# Patient Record
Sex: FEMALE | Race: WHITE | NOT HISPANIC OR LATINO | URBAN - METROPOLITAN AREA
[De-identification: names, ages, dates, MRNs, and addresses within clinical notes are randomized per-mention and may not be internally consistent; named-entity substitution may affect disease eponyms.]

---
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
## 2022-06-02 ENCOUNTER — OFFICE VISIT (OUTPATIENT)
Dept: OBSTETRICS AND GYNECOLOGY | Facility: CLINIC | Age: 22
End: 2022-06-02

## 2022-06-02 VITALS
BODY MASS INDEX: 22.22 KG/M2 | SYSTOLIC BLOOD PRESSURE: 100 MMHG | DIASTOLIC BLOOD PRESSURE: 58 MMHG | WEIGHT: 155.2 LBS | HEIGHT: 70 IN

## 2022-06-02 DIAGNOSIS — Z11.3 SCREENING FOR STD (SEXUALLY TRANSMITTED DISEASE): ICD-10-CM

## 2022-06-02 DIAGNOSIS — Z01.419 ENCOUNTER FOR GYNECOLOGICAL EXAMINATION WITHOUT ABNORMAL FINDING: Primary | ICD-10-CM

## 2022-06-02 DIAGNOSIS — Z30.40 ENCOUNTER FOR REFILL OF PRESCRIPTION FOR CONTRACEPTION: ICD-10-CM

## 2022-06-02 PROCEDURE — 99395 PREV VISIT EST AGE 18-39: CPT | Performed by: OBSTETRICS & GYNECOLOGY

## 2022-06-02 RX ORDER — LEVONORGESTREL AND ETHINYL ESTRADIOL 0.1-0.02MG
1 KIT ORAL DAILY
Qty: 84 TABLET | Refills: 3 | Status: SHIPPED | OUTPATIENT
Start: 2022-06-02 | End: 2023-06-02

## 2022-06-02 NOTE — PROGRESS NOTES
Gynecologic Annual Exam Note        CC- Here for annual exam.     Subjective     HPI  Shavon Douglas is a 21 y.o. female established patient who presents for annual well woman exam. Patient's last menstrual period was 2022 (exact date). Her periods are regular every 28-30 days, lasting 5 days and are heavy for the first 3 days and then the last 2 days are light and clots are present.  She reports moderate, severe dysmenorrhea for the first 3 days of her period and 1 day prior to the start of it.  Partner Status: Marital Status: single.  New Partners since last visit: YES/NO/Other: yes.  Condom usage with IC: always.    The patient has complaints today. She reports that she is still having right lower pelvic pain around her ovary, she reports it has improved since her last visit and mainly notices the pain around her period. She also needs a refill on her BC pills. She is requesting a 90 day supply if possible.      She exercises regularly: yes.  She has concerns about domestic violence: no.    OB History        0    Para   0    Term   0       0    AB   0    Living   0       SAB   0    IAB   0    Ectopic   0    Molar   0    Multiple   0    Live Births   0                Current contraception: condoms and OCP (estrogen/progesterone)  History of abnormal Pap smear: no  Family history of uterine, colon or ovarian cancer: no  Family history of breast cancer: no  H/o STDs: no   Last pap: never preformed  Gardasil:completed    Last Pap : never preformed due to age, std screening negative on 23  Last Completed Pap Smear     This patient has no relevant Health Maintenance data.          Health Maintenance   Topic Date Due   • ANNUAL PHYSICAL  Never done   • HEPATITIS C SCREENING  Never done   • CHLAMYDIA SCREENING  2022   • INFLUENZA VACCINE  2022   • TDAP/TD VACCINES (2 - Td or Tdap) 2023   • COVID-19 Vaccine  Completed   • MENINGOCOCCAL VACCINE  Completed   • HPV VACCINES   "Completed   • Pneumococcal Vaccine 0-64  Aged Out       The following portions of the patient's history were reviewed and updated as appropriate: allergies, current medications, past family history, past medical history, past social history, past surgical history and problem list.    Review of Systems  Review of Systems   Constitutional: Negative.    HENT: Negative.    Eyes: Negative.    Respiratory: Negative.    Cardiovascular: Negative.    Gastrointestinal: Negative.    Endocrine: Negative.    Genitourinary: Positive for menstrual problem (painful menstruals ) and pelvic pain (right pelvic pain ).   Musculoskeletal: Negative.    Skin: Negative.    Allergic/Immunologic: Negative.    Neurological: Negative.    Hematological: Negative.    Psychiatric/Behavioral: Negative.        I have reviewed and agree with the HPI, ROS, and historical information as entered above. Lesa Abraham MD      Past Medical History:   Diagnosis Date   • Asthma as child    • Dysmenorrhea    • Gardasil Series Completed    • Irregular menses    • Menorrhagia         Past Surgical History:   Procedure Laterality Date   • FINGER FRACTURE SURGERY Left    • WISDOM TOOTH EXTRACTION            Objective   /58   Ht 177.8 cm (70\")   Wt 70.4 kg (155 lb 3.2 oz)   LMP 05/23/2022 (Exact Date)   BMI 22.27 kg/m²     Physical Exam  Vitals and nursing note reviewed. Exam conducted with a chaperone present.   Constitutional:       General: She is awake.   HENT:      Head: Normocephalic and atraumatic.   Neck:      Thyroid: No thyroid mass, thyromegaly or thyroid tenderness.   Cardiovascular:      Rate and Rhythm: Normal rate.      Heart sounds: No murmur heard.    No friction rub. No gallop.   Pulmonary:      Effort: Pulmonary effort is normal.      Breath sounds: Normal breath sounds. No stridor. No wheezing, rhonchi or rales.   Chest:      Chest wall: No mass or tenderness.   Breasts:      Right: Normal. No bleeding, inverted nipple, mass, nipple " discharge, skin change, tenderness, axillary adenopathy or supraclavicular adenopathy.      Left: Normal. No bleeding, inverted nipple, mass, nipple discharge, skin change, tenderness, axillary adenopathy or supraclavicular adenopathy.       Abdominal:      Palpations: Abdomen is soft.      Tenderness: There is no guarding or rebound.      Hernia: There is no hernia in the left inguinal area or right inguinal area.   Genitourinary:     General: Normal vulva.      Pubic Area: No rash.       Labia:         Right: No rash, tenderness, lesion or injury.         Left: No rash, tenderness, lesion or injury.       Urethra: No prolapse, urethral swelling or urethral lesion.      Vagina: No foreign body. No vaginal discharge, erythema, tenderness, bleeding or lesions.      Cervix: No cervical motion tenderness, discharge, friability, lesion, erythema or cervical bleeding.      Uterus: Normal. Not deviated, not enlarged, not fixed and not tender.       Adnexa: Right adnexa normal and left adnexa normal.        Right: No mass, tenderness or fullness.          Left: No mass, tenderness or fullness.        Rectum: No external hemorrhoid.   Musculoskeletal:      Cervical back: Normal range of motion.   Lymphadenopathy:      Upper Body:      Right upper body: No supraclavicular or axillary adenopathy.      Left upper body: No supraclavicular or axillary adenopathy.   Skin:     General: Skin is warm.   Neurological:      Mental Status: She is alert and oriented to person, place, and time.   Psychiatric:         Mood and Affect: Mood and affect normal.         Speech: Speech normal.          Assessment   Assessment  Problem List Items Addressed This Visit    None     Visit Diagnoses     Encounter for gynecological examination without abnormal finding    -  Primary    Screening for STD (sexually transmitted disease)        Relevant Orders    Chlamydia trachomatis, Neisseria gonorrhoeae, PCR w/ confirmation - Swab, Cervix    Encounter  for refill of prescription for contraception        Relevant Medications    levonorgestrel-ethinyl estradiol (AVIANE,ALESSE,LESSINA) 0.1-20 MG-MCG per tablet            Assessment     Problem List Items Addressed This Visit    None     Visit Diagnoses     Encounter for gynecological examination without abnormal finding    -  Primary    Screening for STD (sexually transmitted disease)        Relevant Orders    Chlamydia trachomatis, Neisseria gonorrhoeae, PCR w/ confirmation - Swab, Cervix    Encounter for refill of prescription for contraception        Relevant Medications    levonorgestrel-ethinyl estradiol (AVIANE,ALESSE,LESSINA) 0.1-20 MG-MCG per tablet            Plan     1. Encouraged use of condoms for STD prevention.  2. Reviewed monthly self breast exams.  Instructed to call with lumps, pain, or breast discharge.    3. RTC in 1 year or PRN with problems  4. Doing well on OCPs, periods much improved.  Call prn  5. Goes to , .  Wants to go to PT school.         Lesa Abraham MD  06/02/2022

## 2022-06-06 LAB
C TRACH RRNA SPEC QL NAA+PROBE: NEGATIVE
N GONORRHOEA RRNA SPEC QL NAA+PROBE: NEGATIVE

## 2022-06-07 ENCOUNTER — TELEPHONE (OUTPATIENT)
Dept: OBSTETRICS AND GYNECOLOGY | Facility: CLINIC | Age: 22
End: 2022-06-07

## 2023-05-27 DIAGNOSIS — Z30.40 ENCOUNTER FOR REFILL OF PRESCRIPTION FOR CONTRACEPTION: ICD-10-CM

## 2023-05-30 ENCOUNTER — TELEPHONE (OUTPATIENT)
Dept: OBSTETRICS AND GYNECOLOGY | Facility: CLINIC | Age: 23
End: 2023-05-30

## 2023-05-30 DIAGNOSIS — Z30.40 ENCOUNTER FOR REFILL OF PRESCRIPTION FOR CONTRACEPTION: ICD-10-CM

## 2023-05-30 RX ORDER — LEVONORGESTREL AND ETHINYL ESTRADIOL 0.1-0.02MG
1 KIT ORAL DAILY
Qty: 28 TABLET | Refills: 0 | Status: SHIPPED | OUTPATIENT
Start: 2023-05-30

## 2023-05-30 RX ORDER — LEVONORGESTREL AND ETHINYL ESTRADIOL 0.1-0.02MG
KIT ORAL
Qty: 28 TABLET | Refills: 0 | Status: SHIPPED | OUTPATIENT
Start: 2023-05-30 | End: 2023-05-30 | Stop reason: SDUPTHER

## 2023-05-30 NOTE — TELEPHONE ENCOUNTER
Patient calling to get BC refilled. Patient has annual schedule for 6/8/23.    Pharmacy on file has been verified

## 2023-06-08 ENCOUNTER — OFFICE VISIT (OUTPATIENT)
Dept: OBSTETRICS AND GYNECOLOGY | Facility: CLINIC | Age: 23
End: 2023-06-08
Payer: COMMERCIAL

## 2023-06-08 ENCOUNTER — TELEPHONE (OUTPATIENT)
Dept: OBSTETRICS AND GYNECOLOGY | Facility: CLINIC | Age: 23
End: 2023-06-08

## 2023-06-08 VITALS
WEIGHT: 157.8 LBS | DIASTOLIC BLOOD PRESSURE: 66 MMHG | HEIGHT: 70 IN | BODY MASS INDEX: 22.59 KG/M2 | SYSTOLIC BLOOD PRESSURE: 102 MMHG

## 2023-06-08 DIAGNOSIS — Z01.419 WELL WOMAN EXAM: Primary | ICD-10-CM

## 2023-06-08 DIAGNOSIS — N94.6 DYSMENORRHEA: ICD-10-CM

## 2023-06-08 DIAGNOSIS — Z30.40 ENCOUNTER FOR REFILL OF PRESCRIPTION FOR CONTRACEPTION: ICD-10-CM

## 2023-06-08 DIAGNOSIS — R10.2 PELVIC PAIN: ICD-10-CM

## 2023-06-08 DIAGNOSIS — Z11.3 SCREENING EXAMINATION FOR STD (SEXUALLY TRANSMITTED DISEASE): ICD-10-CM

## 2023-06-08 RX ORDER — IBUPROFEN 800 MG/1
800 TABLET ORAL EVERY 8 HOURS PRN
Qty: 30 TABLET | Refills: 0 | Status: SHIPPED | OUTPATIENT
Start: 2023-06-08

## 2023-06-08 RX ORDER — DROSPIRENONE AND ESTETROL 3-14.2(28)
1 KIT ORAL DAILY
Qty: 84 TABLET | Refills: 3 | Status: SHIPPED | OUTPATIENT
Start: 2023-06-08

## 2023-06-08 RX ORDER — IBUPROFEN 200 MG
200 TABLET ORAL EVERY 6 HOURS PRN
Qty: 30 TABLET | Refills: 3 | Status: CANCELLED | OUTPATIENT
Start: 2023-06-08

## 2023-06-08 NOTE — TELEPHONE ENCOUNTER
PA Case: [id number], Status: Approved for [employer], Coverage Starts on: 6/8/2023 12:00:00 AM, Coverage Ends on: 6/7/2024 12:00:00 AM.

## 2023-06-08 NOTE — PROGRESS NOTES
Gynecologic Annual Exam Note        CC- Here for annual exam.     Subjective     HPI  Shavon Douglas is a 22 y.o. female established patient who presents for annual well woman exam. Patient's last menstrual period was 2023 (exact date)..  Her periods are regular every 28-30 days, lasting 5 days and are heavy and clots are absent.  Patient reports using about 3 tampons per day. She reports moderate dysmenorrhea.  SPartner Status: Marital Status: single.  New Partners since last visit: YES/NO/Other: yes.  Condom usage with IC: always.    The patient has any complaints today. Patient reports intermittent pain in her right lower abdomen. She states she had an ovarian cyst 2 years ago. She describes the pain as a cramping, dull, sometimes sharp ache that radiates to her lower back. She notices an increase in pain after intercourse and before her menstrual cycle. She states her 600 mg of ibuprofen have helped with the pain but she is out and needs a refill of it.     She exercises regularly: yes.  She has concerns about domestic violence: no.    OB History          0    Para   0    Term   0       0    AB   0    Living   0         SAB   0    IAB   0    Ectopic   0    Molar   0    Multiple   0    Live Births   0                Current contraception: OCP (estrogen/progesterone). Patient is currently taking Lessina, would like to discuss other OCP's due to heavy flow  History of abnormal Pap smear:  never done  Family history of uterine, colon or ovarian cancer: no  Family history of breast cancer: no  H/o STDs: no  Last pap:never done  Gardasil:completed    Last Pap :   Last Completed Pap Smear       This patient has no relevant Health Maintenance data.            Health Maintenance   Topic Date Due    HEPATITIS C SCREENING  Never done    ANNUAL PHYSICAL  Never done    COVID-19 Vaccine (4 - Pfizer series) 2021    CHLAMYDIA SCREENING  2023    INFLUENZA VACCINE  2023    TDAP/TD  "VACCINES (3 - Td or Tdap) 06/02/2032    MENINGOCOCCAL VACCINE  Completed    HPV VACCINES  Completed    Pneumococcal Vaccine 0-64  Aged Out       The following portions of the patient's history were reviewed and updated as appropriate: allergies, current medications, past family history, past medical history, past social history, past surgical history, and problem list.    Review of Systems  Review of Systems   Constitutional: Negative.    HENT: Negative.     Eyes: Negative.    Respiratory: Negative.     Cardiovascular: Negative.    Gastrointestinal: Negative.    Endocrine: Negative.    Genitourinary: Negative.    Musculoskeletal: Negative.    Skin: Negative.    Allergic/Immunologic: Negative.    Neurological: Negative.    Hematological: Negative.    Psychiatric/Behavioral: Negative.       I have reviewed and agree with the HPI, ROS, and historical information as entered above. Lesa Abraham MD      Past Medical History:   Diagnosis Date    Asthma as child     Dysmenorrhea     Gardasil Series Completed     Irregular menses     Menorrhagia         Past Surgical History:   Procedure Laterality Date    FINGER FRACTURE SURGERY Left     WISDOM TOOTH EXTRACTION            Objective   /66 (BP Location: Right arm, Patient Position: Sitting, Cuff Size: Adult)   Ht 177.8 cm (70\")   Wt 71.6 kg (157 lb 12.8 oz)   LMP 05/29/2023 (Exact Date)   Breastfeeding No   BMI 22.64 kg/m²     Physical Exam  Vitals and nursing note reviewed. Exam conducted with a chaperone present.   Constitutional:       General: She is awake.   HENT:      Head: Normocephalic and atraumatic.   Neck:      Thyroid: No thyroid mass, thyromegaly or thyroid tenderness.   Cardiovascular:      Rate and Rhythm: Normal rate.      Heart sounds: No murmur heard.    No friction rub. No gallop.   Pulmonary:      Effort: Pulmonary effort is normal.      Breath sounds: Normal breath sounds. No stridor. No wheezing, rhonchi or rales.   Chest:      Chest wall: " No mass or tenderness.   Breasts:     Right: Normal. No bleeding, inverted nipple, mass, nipple discharge, skin change or tenderness.      Left: Normal. No bleeding, inverted nipple, mass, nipple discharge, skin change or tenderness.   Abdominal:      Palpations: Abdomen is soft.      Tenderness: There is no guarding or rebound.      Hernia: There is no hernia in the left inguinal area or right inguinal area.   Genitourinary:     General: Normal vulva.      Pubic Area: No rash.       Labia:         Right: No rash, tenderness, lesion or injury.         Left: No rash, tenderness, lesion or injury.       Urethra: No prolapse, urethral swelling or urethral lesion.      Vagina: No foreign body. No vaginal discharge, erythema, tenderness, bleeding or lesions.      Cervix: No cervical motion tenderness, discharge, friability, lesion, erythema or cervical bleeding.      Uterus: Normal. Not deviated, not enlarged, not fixed and not tender.       Adnexa: Right adnexa normal and left adnexa normal.        Right: No mass, tenderness or fullness.          Left: No mass, tenderness or fullness.        Rectum: No external hemorrhoid.   Musculoskeletal:      Cervical back: Normal range of motion.   Lymphadenopathy:      Upper Body:      Right upper body: No supraclavicular or axillary adenopathy.      Left upper body: No supraclavicular or axillary adenopathy.   Skin:     General: Skin is warm.   Neurological:      Mental Status: She is alert and oriented to person, place, and time.   Psychiatric:         Mood and Affect: Mood and affect normal.         Speech: Speech normal.        Assessment   Assessment  Problem List Items Addressed This Visit    None  Visit Diagnoses       Well woman exam    -  Primary    Relevant Orders    Chlamydia trachomatis, Neisseria gonorrhoeae, PCR - Swab, Cervix    Screening examination for STD (sexually transmitted disease)        Relevant Orders    Chlamydia trachomatis, Neisseria gonorrhoeae, PCR -  Swab, Cervix    Encounter for refill of prescription for contraception        Dysmenorrhea        Relevant Medications    ibuprofen (ADVIL,MOTRIN) 800 MG tablet    Pelvic pain        Relevant Medications    ibuprofen (ADVIL,MOTRIN) 800 MG tablet    Other Relevant Orders    US Non-ob Transvaginal              Assessment     Problem List Items Addressed This Visit    None  Visit Diagnoses       Well woman exam    -  Primary    Relevant Orders    Chlamydia trachomatis, Neisseria gonorrhoeae, PCR - Swab, Cervix    Screening examination for STD (sexually transmitted disease)        Relevant Orders    Chlamydia trachomatis, Neisseria gonorrhoeae, PCR - Swab, Cervix    Encounter for refill of prescription for contraception        Dysmenorrhea        Relevant Medications    ibuprofen (ADVIL,MOTRIN) 800 MG tablet    Pelvic pain        Relevant Medications    ibuprofen (ADVIL,MOTRIN) 800 MG tablet    Other Relevant Orders    US Non-ob Transvaginal              Plan     Encouraged use of condoms for STD prevention.  Reviewed monthly self breast exams.  Instructed to call with lumps, pain, or breast discharge.    Pt having long and heavy periods with current OCP.  We will try switching it to see if this helps.  2 samples Nextellis given today.    Pt reports intermittent RLQ pain that she feels may be secondary to an ovarian cyst.  She is taking rx motrin and this helps.  The pain is dull, achey and worsens prior to menses and with intercourse.  She will RTC for u/s to further evaluate, STD screen done today.         Lesa Abraham MD  06/08/2023

## 2023-06-13 LAB
C TRACH RRNA SPEC QL NAA+PROBE: NEGATIVE
N GONORRHOEA RRNA SPEC QL NAA+PROBE: NEGATIVE

## 2023-07-31 ENCOUNTER — TELEPHONE (OUTPATIENT)
Dept: OBSTETRICS AND GYNECOLOGY | Facility: CLINIC | Age: 23
End: 2023-07-31
Payer: COMMERCIAL

## 2023-07-31 DIAGNOSIS — Z30.41 ENCOUNTER FOR SURVEILLANCE OF CONTRACEPTIVE PILLS: Primary | ICD-10-CM

## 2023-07-31 RX ORDER — DESOGESTREL AND ETHINYL ESTRADIOL 0.15-0.03
1 KIT ORAL DAILY
Qty: 84 TABLET | Refills: 3 | Status: SHIPPED | OUTPATIENT
Start: 2023-07-31 | End: 2024-07-30

## 2024-05-21 DIAGNOSIS — Z30.41 ENCOUNTER FOR SURVEILLANCE OF CONTRACEPTIVE PILLS: ICD-10-CM

## 2024-05-21 RX ORDER — DESOGESTREL AND ETHINYL ESTRADIOL 0.15-0.03
1 KIT ORAL DAILY
Qty: 30 TABLET | Refills: 0 | Status: SHIPPED | OUTPATIENT
Start: 2024-05-21 | End: 2025-05-21

## 2024-05-21 NOTE — TELEPHONE ENCOUNTER
Caller: Shavon Douglas    Relationship: Self    Best call back number: [phone]    Requested Prescriptions:   Requested Prescriptions     Pending Prescriptions Disp Refills    desogestrel-ethinyl estradiol (APRI) 0.15-30 MG-MCG per tablet 84 tablet 3     Sig: Take 1 tablet by mouth Daily.        Pharmacy where request should be sent: [facility] [id number] [address] [phone] [facility] [phone] FX     Last office visit with prescribing clinician: 7/6/2023     Next office visit with prescribing clinician: 6/11/24 W/ PADMINI GRAHAM    Does the patient have less than a 3 day supply:  [] Yes  [x] No    Would you like a call back once the refill request has been completed: [] Yes [x] No

## 2024-06-11 ENCOUNTER — OFFICE VISIT (OUTPATIENT)
Dept: OBSTETRICS AND GYNECOLOGY | Facility: CLINIC | Age: 24
End: 2024-06-11
Payer: COMMERCIAL

## 2024-06-11 VITALS — BODY MASS INDEX: 23.1 KG/M2 | DIASTOLIC BLOOD PRESSURE: 74 MMHG | WEIGHT: 161 LBS | SYSTOLIC BLOOD PRESSURE: 110 MMHG

## 2024-06-11 DIAGNOSIS — Z30.41 ENCOUNTER FOR SURVEILLANCE OF CONTRACEPTIVE PILLS: ICD-10-CM

## 2024-06-11 DIAGNOSIS — Z01.419 ROUTINE GYNECOLOGICAL EXAMINATION: Primary | ICD-10-CM

## 2024-06-11 PROCEDURE — 99395 PREV VISIT EST AGE 18-39: CPT | Performed by: ADVANCED PRACTICE MIDWIFE

## 2024-06-11 RX ORDER — DESOGESTREL AND ETHINYL ESTRADIOL 0.15-0.03
1 KIT ORAL DAILY
Qty: 84 TABLET | Refills: 3 | Status: SHIPPED | OUTPATIENT
Start: 2024-06-11 | End: 2025-06-11

## 2024-06-11 NOTE — PROGRESS NOTES
Gynecologic Annual Exam Note        Gynecologic Exam        Subjective     HPI  Shavon Douglas is a 23 y.o.  female who presents for annual well woman exam as an established patient. There were no changes to her medical or surgical history since her last visit.. Patient's last menstrual period was 2024 (exact date). Her periods occur every 28-30 days, lasting 5 days.  The flow is heavy. She reports dysmenorrhea is moderate-severe on the first/heavy days occurring throughout menses.She does have a partner. She is sexually active. She has not had new partners. STD testing recommendations have been explained to the patient and she does desire STD testing.    The patient would like to discuss the following complaints today: she reports her symptoms (cramping and heavier bleeding)  are worse with this birth control premenstrually and days 1-2 of menses. She is not interested in switching again at this point.     Additional OB/GYN History   contraceptive methods: Condoms and OCP (estrogen/progesterone)  Desires to: continue contraception  Thromboembolic Disease: none  History of migraines: no  History of STD: no    Last Pap : never r/t age  Last Completed Pap Smear       This patient has no relevant Health Maintenance data.             History of abnormal Pap smear: no  Gardasil status:completed  Family history of uterine, colon, breast, or ovarian cancer: no  Performs monthly Self-Breast Exam: no  Exercises Regularly:yes  Feelings of Anxiety or Depression: no  Tobacco Usage?: No       Current Outpatient Medications:     desogestrel-ethinyl estradiol (APRI) 0.15-30 MG-MCG per tablet, Take 1 tablet by mouth Daily., Disp: 84 tablet, Rfl: 3    ibuprofen (ADVIL,MOTRIN) 800 MG tablet, Take 1 tablet by mouth Every 8 (Eight) Hours As Needed for Moderate Pain., Disp: 30 tablet, Rfl: 0     Patient is requesting refills of OCP.    OB History          0    Para   0    Term   0       0    AB   0     Living   0         SAB   0    IAB   0    Ectopic   0    Molar   0    Multiple   0    Live Births   0                Health Maintenance   Topic Date Due    HEPATITIS C SCREENING  Never done    ANNUAL PHYSICAL  Never done    PAP SMEAR  Never done    COVID-19 Vaccine (4 - 2023-24 season) 09/01/2023    CHLAMYDIA SCREENING  06/08/2024    Annual Gynecologic Pelvic and Breast Exam  06/09/2024    INFLUENZA VACCINE  08/01/2024    TDAP/TD VACCINES (3 - Td or Tdap) 06/02/2032    MENINGOCOCCAL VACCINE  Completed    HPV VACCINES  Completed    Pneumococcal Vaccine 0-64  Aged Out       Past Medical History:   Diagnosis Date    Asthma as child     Dysmenorrhea     Gardasil Series Completed     Irregular menses     Menorrhagia         Past Surgical History:   Procedure Laterality Date    FINGER FRACTURE SURGERY Left     WISDOM TOOTH EXTRACTION         The additional following portions of the patient's history were reviewed and updated as appropriate: allergies, current medications, past family history, past medical history, past social history, and past surgical history.    Review of Systems   Constitutional: Negative.    Respiratory: Negative.     Cardiovascular: Negative.    Gastrointestinal: Negative.    Genitourinary: Negative.    Psychiatric/Behavioral: Negative.           I have reviewed and agree with the HPI, ROS, and historical information as entered above. PADMINI Smith          Objective   /74   Wt 73 kg (161 lb)   LMP 05/21/2024 (Exact Date)   BMI 23.10 kg/m²     Physical Exam  Vitals and nursing note reviewed. Exam conducted with a chaperone present.   Constitutional:       General: She is not in acute distress.     Appearance: Normal appearance. She is well-developed and normal weight. She is not ill-appearing.   Neck:      Thyroid: No thyroid mass or thyromegaly.   Pulmonary:      Effort: Pulmonary effort is normal. No respiratory distress or retractions.   Chest:      Chest wall: No mass.   Breasts:      Right: Normal. No mass, nipple discharge, skin change or tenderness.      Left: Normal. No mass, nipple discharge, skin change or tenderness.   Abdominal:      General: There is no distension.      Palpations: Abdomen is soft. Abdomen is not rigid. There is no mass.      Tenderness: There is no abdominal tenderness. There is no guarding or rebound.      Hernia: No hernia is present.   Genitourinary:     General: Normal vulva.      Exam position: Lithotomy position.      Labia:         Right: No rash, tenderness or lesion.         Left: No rash, tenderness or lesion.       Vagina: Normal. No vaginal discharge, bleeding or lesions.      Cervix: Normal. No cervical motion tenderness, discharge, friability or cervical bleeding.      Uterus: Normal. Not enlarged, not fixed and not tender.       Adnexa: Right adnexa normal and left adnexa normal.        Right: No mass or tenderness.          Left: No mass or tenderness.        Rectum: Normal. No external hemorrhoid.   Musculoskeletal:      Cervical back: No muscular tenderness.   Skin:     General: Skin is warm and dry.   Neurological:      Mental Status: She is alert and oriented to person, place, and time.   Psychiatric:         Mood and Affect: Mood normal.         Behavior: Behavior normal.            Assessment and Plan    Problem List Items Addressed This Visit    None  Visit Diagnoses       Routine gynecological examination    -  Primary    Relevant Orders    LIQUID-BASED PAP SMEAR WITH HPV GENOTYPING IF ASCUS (CORBY,COR,MAD)    Encounter for surveillance of contraceptive pills        Relevant Medications    desogestrel-ethinyl estradiol (APRI) 0.15-30 MG-MCG per tablet            GYN annual well woman exam.   Reviewed pap guidelines. Obtained today with STI testing.   Encouraged use of condoms for STD prevention.  Fibrocystic breast changes - Encouraged decreasing caffeine, supportive bra, low dose vitamin E supplementation.  Reviewed monthly self breast exams.   Instructed to call with lumps, pain, or breast discharge.    RTC in 1 year or PRN with problems  Return in about 1 year (around 6/11/2025) for Annual physical.    Mirian Fish, PADMINI  06/11/2024

## 2024-06-17 LAB — REF LAB TEST METHOD: NORMAL

## 2024-06-18 ENCOUNTER — TELEPHONE (OUTPATIENT)
Dept: OBSTETRICS AND GYNECOLOGY | Facility: CLINIC | Age: 24
End: 2024-06-18
Payer: COMMERCIAL

## 2024-06-18 NOTE — TELEPHONE ENCOUNTER
Returned patient's call.   Reviewed ASCUS pap results, negative STI/HPV/yeast results, and recommendation to repeat pap in one year.   Reviewed possible causes of ASCUS.   Patient v/u.

## 2025-05-21 DIAGNOSIS — Z30.41 ENCOUNTER FOR SURVEILLANCE OF CONTRACEPTIVE PILLS: ICD-10-CM

## 2025-05-29 RX ORDER — DESOGESTREL AND ETHINYL ESTRADIOL 0.15-0.03
1 KIT ORAL DAILY
Qty: 84 TABLET | Refills: 0 | Status: SHIPPED | OUTPATIENT
Start: 2025-05-29

## 2025-05-29 NOTE — TELEPHONE ENCOUNTER
Caller: Shavon Douglas    Relationship: Self    Best call back number: [phone]      Requested Prescriptions:   Requested Prescriptions     Pending Prescriptions Disp Refills    Apri 0.15-30 MG-MCG per tablet [Pharmacy Med Name: APRI 28 DAY TABLET] 84 tablet 3     Sig: TAKE 1 TABLET BY MOUTH DAILY        Pharmacy where request should be sent: [facility] [id number] [address] [phone] [facility] [phone] FX     Last office visit with prescribing clinician: 6/11/2024   Last telemedicine visit with prescribing clinician: Visit date not found   Next office visit with prescribing clinician: 7/11/2025       Does the patient have less than a 3 day supply:  [x] Yes  [] No    PATIENT IS REQUESTING THIS REFILL BE SENT WITHIN 3 HOURS

## 2025-07-11 ENCOUNTER — OFFICE VISIT (OUTPATIENT)
Dept: OBSTETRICS AND GYNECOLOGY | Facility: CLINIC | Age: 25
End: 2025-07-11
Payer: COMMERCIAL

## 2025-07-11 VITALS
SYSTOLIC BLOOD PRESSURE: 106 MMHG | HEIGHT: 70 IN | BODY MASS INDEX: 22.94 KG/M2 | WEIGHT: 160.2 LBS | DIASTOLIC BLOOD PRESSURE: 70 MMHG

## 2025-07-11 DIAGNOSIS — Z01.419 ROUTINE GYNECOLOGICAL EXAMINATION: Primary | ICD-10-CM

## 2025-07-11 DIAGNOSIS — Z30.41 ENCOUNTER FOR SURVEILLANCE OF CONTRACEPTIVE PILLS: ICD-10-CM

## 2025-07-11 RX ORDER — DESOGESTREL AND ETHINYL ESTRADIOL 0.15-0.03
1 KIT ORAL DAILY
Qty: 84 TABLET | Refills: 4 | Status: SHIPPED | OUTPATIENT
Start: 2025-07-11

## 2025-07-11 NOTE — PROGRESS NOTES
Gynecologic Annual Exam Note        Routine Prenatal Visit        Subjective     HPI  Shavon Douglas is a 24 y.o.  female who presents for annual well woman exam as an established patient. There were no changes to her medical or surgical history since her last visit. Patient's last menstrual period was 2025 (exact date). Her periods occur every 28 days, lasting 5 days.  The flow is moderate. She reports dysmenorrhea is moderate occurring first 1-2 days of flow. Marital Status: single.  She is sexually active. She has not had new partners. STD testing recommendations have been explained to the patient and she does desire STD testing.    The patient would like to discuss the following complaints today: None.    Additional OB/GYN History   contraceptive methods: Condoms and OCP (estrogen/progesterone)  Desires to: continue contraception  Thromboembolic Disease: family history- MU and MC have clotting disorder, unsure of diagnosis.   History of migraines: yes without aura    History of STD: no    Last Pap : 24. Results: ASCUS. HPV: negative.   Last Completed Pap Smear            Awaiting Completion       PAP SMEAR (Every 3 Years) Order placed this encounter      2025  Order placed for LIQUID-BASED PAP SMEAR WITH HPV GENOTYPING IF ASCUS (CORBY,VINCENT,MICHEL) by Mirian Fish APRN    2024  LIQUID-BASED PAP SMEAR WITH HPV GENOTYPING IF ASCUS (CORBY,COR,MICHEL)                             History of abnormal Pap smear: yes -  ASCUS  Gardasil status:completed  Family history of uterine, colon, breast, or ovarian cancer: no  Performs monthly Self-Breast Exam: no  Exercises Regularly:yes  Feelings of Anxiety or Depression: no  Tobacco Usage?: No       Current Outpatient Medications:     desogestrel-ethinyl estradiol (Apri) 0.15-30 MG-MCG per tablet, Take 1 tablet by mouth Daily., Disp: 84 tablet, Rfl: 4     Patient is requesting refills of Apri OCP.    OB History          0    Para   0    Term  "  0       0    AB   0    Living   0         SAB   0    IAB   0    Ectopic   0    Molar   0    Multiple   0    Live Births   0                Health Maintenance   Topic Date Due    HEPATITIS C SCREENING  Never done    ANNUAL PHYSICAL  Never done    Pneumococcal Vaccine 0-49 (1 of 2 - PCV) 2021    COVID-19 Vaccine (4 - - season) 2024    CHLAMYDIA SCREENING  2025    Annual Gynecologic Pelvic and Breast Exam  2025    INFLUENZA VACCINE  10/01/2025    PAP SMEAR  2027    TDAP/TD VACCINES (3 - Td or Tdap) 2032    MENINGOCOCCAL B VACCINE  Completed    MENINGOCOCCAL VACCINE  Completed    HPV VACCINES  Completed       Past Medical History:   Diagnosis Date    Asthma as child     Dysmenorrhea     Gardasil Series Completed     Irregular menses     Menorrhagia         Past Surgical History:   Procedure Laterality Date    FINGER FRACTURE SURGERY Left     WISDOM TOOTH EXTRACTION         The additional following portions of the patient's history were reviewed and updated as appropriate: allergies, current medications, past family history, past medical history, past social history, past surgical history, and problem list.    Review of Systems   Constitutional: Negative.    HENT: Negative.     Eyes: Negative.    Respiratory: Negative.     Cardiovascular: Negative.    Gastrointestinal: Negative.    Endocrine: Negative.    Genitourinary: Negative.    Musculoskeletal: Negative.    Skin: Negative.    Allergic/Immunologic: Negative.    Neurological: Negative.    Hematological: Negative.    Psychiatric/Behavioral: Negative.           I have reviewed and agree with the HPI, ROS, and historical information as entered above. PADMINI Smith          Objective   /70   Ht 177.8 cm (70\")   Wt 72.7 kg (160 lb 3.2 oz)   LMP 2025 (Exact Date)   BMI 22.99 kg/m²     Physical Exam  Vitals and nursing note reviewed. Exam conducted with a chaperone present.   Constitutional:       " General: She is not in acute distress.     Appearance: Normal appearance. She is well-developed and normal weight. She is not ill-appearing.   Neck:      Thyroid: No thyroid mass or thyromegaly.   Pulmonary:      Effort: Pulmonary effort is normal. No respiratory distress or retractions.   Chest:      Chest wall: No mass.   Breasts:     Right: Normal. No mass, nipple discharge, skin change or tenderness.      Left: Normal. No mass, nipple discharge, skin change or tenderness.   Abdominal:      General: There is no distension.      Palpations: Abdomen is soft. Abdomen is not rigid. There is no mass.      Tenderness: There is no abdominal tenderness. There is no guarding or rebound.      Hernia: No hernia is present.   Genitourinary:     General: Normal vulva.      Exam position: Lithotomy position.      Labia:         Right: No rash, tenderness or lesion.         Left: No rash, tenderness or lesion.       Vagina: Normal. No vaginal discharge, bleeding or lesions.      Cervix: Friability present. No cervical motion tenderness, discharge or cervical bleeding.      Uterus: Normal. Not enlarged, not fixed and not tender.       Adnexa: Right adnexa normal and left adnexa normal.        Right: No mass or tenderness.          Left: No mass or tenderness.        Rectum: Normal. No external hemorrhoid.   Musculoskeletal:      Cervical back: No muscular tenderness.   Skin:     General: Skin is warm and dry.   Neurological:      Mental Status: She is alert and oriented to person, place, and time.   Psychiatric:         Mood and Affect: Mood normal.         Behavior: Behavior normal.            Assessment and Plan    Problem List Items Addressed This Visit    None  Visit Diagnoses         Routine gynecological examination    -  Primary    Relevant Orders    LIQUID-BASED PAP SMEAR WITH HPV GENOTYPING IF ASCUS (CORBY,COR,MAD)      Encounter for surveillance of contraceptive pills        Relevant Medications    desogestrel-ethinyl  estradiol (Apri) 0.15-30 MG-MCG per tablet            GYN annual well woman exam.   Reviewed pap guidelines. Obtained today.   Refills of OCP sent.   Encouraged use of condoms for STD prevention.  Reviewed monthly self breast exams.  Instructed to call with lumps, pain, or breast discharge.    RTC in 1 year or PRN with problems  Return in about 1 year (around 7/11/2026) for Annual physical.    Mirian Fish, PADMINI  07/11/2025

## 2025-07-14 LAB — REF LAB TEST METHOD: NORMAL

## 2025-07-21 ENCOUNTER — TELEPHONE (OUTPATIENT)
Dept: OBSTETRICS AND GYNECOLOGY | Facility: CLINIC | Age: 25
End: 2025-07-21
Payer: COMMERCIAL